# Patient Record
Sex: FEMALE | NOT HISPANIC OR LATINO | Employment: OTHER | URBAN - NONMETROPOLITAN AREA
[De-identification: names, ages, dates, MRNs, and addresses within clinical notes are randomized per-mention and may not be internally consistent; named-entity substitution may affect disease eponyms.]

---

## 2023-12-21 ENCOUNTER — TELEPHONE (OUTPATIENT)
Dept: CARDIOLOGY | Facility: CLINIC | Age: 63
End: 2023-12-21

## 2023-12-21 NOTE — TELEPHONE ENCOUNTER
Lm to schedule referral [facility] med [facility] tachy first real avalilability per jc gm 1/18

## 2024-01-20 NOTE — TELEPHONE ENCOUNTER
Spoke to patient today. She stated she did not know [facility] would send over another referral. She states she is wanting to see neurology first and is scheduled there in March. She said she will set this appointment up after, unless she needs to see us sooner. I advised her referral will stay in the system and she can call to schedule anytime.

## 2024-06-07 ENCOUNTER — TELEPHONE (OUTPATIENT)
Dept: NEUROLOGY | Facility: CLINIC | Age: 64
End: 2024-06-07
Payer: MEDICARE

## 2024-06-07 NOTE — TELEPHONE ENCOUNTER
Pt called and is concerned that she will never be able to come in the office for an appt because its too far for her to come here. She wants to know if she should keep her appt with you and continue to see you virtually or should she see a Neurologist close to home just in case she needs to be seen in office? Please review and advise, thanks

## 2024-06-07 NOTE — TELEPHONE ENCOUNTER
It's really up to her.  Since I havent' seen her, I 'm unable to give her any medical advice until our appointment.

## 2024-06-14 ENCOUNTER — APPOINTMENT (OUTPATIENT)
Dept: NEUROLOGY | Facility: CLINIC | Age: 64
End: 2024-06-14
Payer: MEDICARE